# Patient Record
Sex: MALE | Race: WHITE | NOT HISPANIC OR LATINO | Employment: OTHER | URBAN - METROPOLITAN AREA
[De-identification: names, ages, dates, MRNs, and addresses within clinical notes are randomized per-mention and may not be internally consistent; named-entity substitution may affect disease eponyms.]

---

## 2017-01-17 RX ORDER — TRAMADOL HYDROCHLORIDE AND ACETAMINOPHEN 37.5; 325 MG/1; MG/1
TABLET, FILM COATED ORAL
Qty: 90 TABLET | Refills: 0 | Status: SHIPPED | OUTPATIENT
Start: 2017-01-17 | End: 2017-05-11 | Stop reason: SDUPTHER

## 2017-05-11 NOTE — TELEPHONE ENCOUNTER
----- Message from Dasia Adkins sent at 5/11/2017  2:06 PM CDT -----  Tramadol APAP 37.5-325 mg    Needs refill  [facility]  Reach pt at [phone]

## 2017-05-12 RX ORDER — TRAMADOL HYDROCHLORIDE AND ACETAMINOPHEN 37.5; 325 MG/1; MG/1
1 TABLET, FILM COATED ORAL EVERY 6 HOURS PRN
Qty: 90 TABLET | Refills: 0 | Status: SHIPPED | OUTPATIENT
Start: 2017-05-12 | End: 2017-07-11

## 2017-06-26 PROBLEM — R42 DIZZINESS: Status: ACTIVE | Noted: 2017-06-26

## 2017-06-27 PROBLEM — I95.1 ORTHOSTATIC HYPOTENSION: Status: ACTIVE | Noted: 2017-06-26

## 2017-06-27 PROBLEM — I65.01 STENOSIS OF RIGHT VERTEBRAL ARTERY: Status: ACTIVE | Noted: 2017-06-27

## 2017-07-11 ENCOUNTER — OFFICE VISIT (OUTPATIENT)
Dept: FAMILY MEDICINE | Facility: CLINIC | Age: 82
End: 2017-07-11
Payer: MEDICARE

## 2017-07-11 VITALS
WEIGHT: 151.25 LBS | HEIGHT: 71 IN | DIASTOLIC BLOOD PRESSURE: 70 MMHG | OXYGEN SATURATION: 94 % | BODY MASS INDEX: 21.18 KG/M2 | HEART RATE: 68 BPM | SYSTOLIC BLOOD PRESSURE: 110 MMHG

## 2017-07-11 DIAGNOSIS — I10 ESSENTIAL HYPERTENSION: ICD-10-CM

## 2017-07-11 DIAGNOSIS — E03.9 ACQUIRED HYPOTHYROIDISM: ICD-10-CM

## 2017-07-11 DIAGNOSIS — I95.1 ORTHOSTATIC HYPOTENSION: Primary | ICD-10-CM

## 2017-07-11 DIAGNOSIS — I70.0 ATHEROSCLEROSIS OF AORTA: ICD-10-CM

## 2017-07-11 DIAGNOSIS — I65.01 STENOSIS OF RIGHT VERTEBRAL ARTERY: ICD-10-CM

## 2017-07-11 PROCEDURE — 1159F MED LIST DOCD IN RCRD: CPT | Mod: S$GLB,,,

## 2017-07-11 PROCEDURE — 99499 UNLISTED E&M SERVICE: CPT | Mod: S$GLB,,,

## 2017-07-11 PROCEDURE — 99214 OFFICE O/P EST MOD 30 MIN: CPT | Mod: S$GLB,,,

## 2017-07-11 PROCEDURE — 1126F AMNT PAIN NOTED NONE PRSNT: CPT | Mod: S$GLB,,,

## 2017-07-11 PROCEDURE — 99999 PR PBB SHADOW E&M-EST. PATIENT-LVL III: CPT | Mod: PBBFAC,,,

## 2017-07-11 RX ORDER — TRAMADOL HYDROCHLORIDE AND ACETAMINOPHEN 37.5; 325 MG/1; MG/1
1 TABLET, FILM COATED ORAL EVERY 6 HOURS PRN
Qty: 90 TABLET | Refills: 0 | Status: SHIPPED | OUTPATIENT
Start: 2017-07-11 | End: 2017-08-19 | Stop reason: SDUPTHER

## 2017-07-11 RX ORDER — MELOXICAM 7.5 MG/1
7.5 TABLET ORAL CONTINUOUS PRN
COMMUNITY
End: 2017-07-11

## 2017-07-11 NOTE — PROGRESS NOTES
Subjective:       Patient ID: Edwar Jeffries is a 89 y.o. male.    Chief Complaint: Dizziness    HPI The patient presents following a recent hospitalization for dizziness and orthostatic hypotension. He was evaluated at [facility] and found to have evidence of dehydration and incidental finding of 50 % carotid stenosis of right vertebral artery.     Review of Systems   Constitutional: Negative.    Respiratory: Negative.  Negative for shortness of breath.    Cardiovascular: Negative for chest pain.   Psychiatric/Behavioral: Negative.    All other systems reviewed and are negative.      Objective:      Vitals:    07/11/17 1036   BP: 110/70   Pulse: 68     Physical Exam   Constitutional: He is oriented to person, place, and time. He appears well-developed and well-nourished. He is cooperative. No distress.   HENT:   Head: Normocephalic and atraumatic.   Right Ear: Hearing, tympanic membrane, external ear and ear canal normal.   Left Ear: Hearing, external ear and ear canal normal.   Nose: Nose normal.   Mouth/Throat: Oropharynx is clear and moist.   Eyes: Conjunctivae are normal. Pupils are equal, round, and reactive to light.   Neck: Normal range of motion. Neck supple. No thyromegaly present.   Cardiovascular: Normal rate, regular rhythm, normal heart sounds and intact distal pulses.    Pulmonary/Chest: Effort normal and breath sounds normal. No respiratory distress.   Musculoskeletal: Normal range of motion. He exhibits no edema or tenderness.   Lymphadenopathy:     He has no cervical adenopathy.   Neurological: He is alert and oriented to person, place, and time. He has normal strength. Coordination and gait normal.   Skin: Skin is warm, dry and intact. No cyanosis. Nails show no clubbing.   Psychiatric: He has a normal mood and affect. His speech is normal and behavior is normal. Judgment and thought content normal. Cognition and memory are normal.   Vitals reviewed.      Assessment:        1. Orthostatic hypotension    2. Atherosclerosis of aorta    3. Stenosis of right vertebral artery    4. Acquired hypothyroidism    5. Essential hypertension        Plan:       Orthostatic hypotension    Atherosclerosis of aorta    Stenosis of right vertebral artery    Acquired hypothyroidism    Essential hypertension    Other orders  -     Discontinue: zoster vaccine live, PF, (ZOSTAVAX, PF,) 19,400 unit/0.65 mL injection; Inject 19,400 Units into the skin once.  Dispense: 1 vial; Refill: 0  -     zoster vaccine live, PF, (ZOSTAVAX, PF,) 19,400 unit/0.65 mL injection; Inject 19,400 Units into the skin once.  Dispense: 1 vial; Refill: 0  -     tramadol-acetaminophen 37.5-325 mg (ULTRACET) 37.5-325 mg Tab; Take 1 tablet by mouth every 6 (six) hours as needed.  Dispense: 90 tablet; Refill: 0      Return in about 6 months (around 1/11/2018).

## 2017-07-19 ENCOUNTER — TELEPHONE (OUTPATIENT)
Dept: FAMILY MEDICINE | Facility: CLINIC | Age: 82
End: 2017-07-19

## 2017-07-19 NOTE — TELEPHONE ENCOUNTER
Pts daughter is calling to report that they are taking pts BP in the morning and it is still low. 110/54, 90/52, and today 117/70 (did not take BP medication yesterday) please advise on what they should do.

## 2017-08-24 RX ORDER — TRAMADOL HYDROCHLORIDE AND ACETAMINOPHEN 37.5; 325 MG/1; MG/1
TABLET, FILM COATED ORAL
Qty: 90 TABLET | Refills: 0 | Status: SHIPPED | OUTPATIENT
Start: 2017-08-24 | End: 2017-11-30 | Stop reason: SDUPTHER

## 2017-09-07 ENCOUNTER — OFFICE VISIT (OUTPATIENT)
Dept: FAMILY MEDICINE | Facility: CLINIC | Age: 82
End: 2017-09-07
Payer: MEDICARE

## 2017-09-07 ENCOUNTER — TELEPHONE (OUTPATIENT)
Dept: FAMILY MEDICINE | Facility: CLINIC | Age: 82
End: 2017-09-07

## 2017-09-07 VITALS
WEIGHT: 155.19 LBS | SYSTOLIC BLOOD PRESSURE: 120 MMHG | HEIGHT: 71 IN | BODY MASS INDEX: 21.73 KG/M2 | DIASTOLIC BLOOD PRESSURE: 76 MMHG | HEART RATE: 75 BPM | OXYGEN SATURATION: 95 %

## 2017-09-07 DIAGNOSIS — I10 ESSENTIAL HYPERTENSION: ICD-10-CM

## 2017-09-07 DIAGNOSIS — M47.812 SPONDYLOSIS OF CERVICAL REGION WITHOUT MYELOPATHY OR RADICULOPATHY: Primary | ICD-10-CM

## 2017-09-07 PROCEDURE — 99999 PR PBB SHADOW E&M-EST. PATIENT-LVL III: CPT | Mod: PBBFAC,,,

## 2017-09-07 PROCEDURE — 1159F MED LIST DOCD IN RCRD: CPT | Mod: S$GLB,,,

## 2017-09-07 PROCEDURE — 3008F BODY MASS INDEX DOCD: CPT | Mod: S$GLB,,,

## 2017-09-07 PROCEDURE — 99499 UNLISTED E&M SERVICE: CPT | Mod: S$GLB,,,

## 2017-09-07 PROCEDURE — 1125F AMNT PAIN NOTED PAIN PRSNT: CPT | Mod: S$GLB,,,

## 2017-09-07 PROCEDURE — 99213 OFFICE O/P EST LOW 20 MIN: CPT | Mod: 25,S$GLB,,

## 2017-09-07 PROCEDURE — 96372 THER/PROPH/DIAG INJ SC/IM: CPT | Mod: S$GLB,,,

## 2017-09-07 RX ORDER — TRIAMCINOLONE ACETONIDE 40 MG/ML
40 INJECTION, SUSPENSION INTRA-ARTICULAR; INTRAMUSCULAR
Status: COMPLETED | OUTPATIENT
Start: 2017-09-07 | End: 2017-09-07

## 2017-09-07 RX ADMIN — TRIAMCINOLONE ACETONIDE 40 MG: 40 INJECTION, SUSPENSION INTRA-ARTICULAR; INTRAMUSCULAR at 10:09

## 2017-09-07 NOTE — PROGRESS NOTES
Subjective:       Patient ID: Edwar Jeffries is a 89 y.o. male.    Chief Complaint: Neck Pain    HPI The patient presents with complaints of pain in his neck. He was diagnosed with spinal stenosis by Dr. Jada Bojorquez before she retired. Gradually worsening and limiting his ability to go dancing, bowl. It is starting to effect his sleep. He is wearing a neck brace almost constantly. He previously went to PT which he was adverse to and it didn't help. He is not certain when his last X-Ray was taken.      His blood pressure has been monitored and have normal.     Review of Systems   Constitutional: Negative.    Respiratory: Negative.  Negative for shortness of breath.    Cardiovascular: Negative for chest pain.   Musculoskeletal: Positive for neck pain and neck stiffness.   Psychiatric/Behavioral: Positive for sleep disturbance. Negative for agitation, dysphoric mood and suicidal ideas. The patient is not nervous/anxious.    All other systems reviewed and are negative.      Objective:      Vitals:    09/07/17 0913   BP: 120/76   Pulse: 75     Physical Exam   Constitutional: He is oriented to person, place, and time. He appears well-developed and well-nourished. He is cooperative. No distress.   HENT:   Head: Normocephalic and atraumatic.   Right Ear: Hearing, tympanic membrane, external ear and ear canal normal.   Left Ear: Hearing, external ear and ear canal normal.   Nose: Nose normal.   Mouth/Throat: Oropharynx is clear and moist.   Eyes: Conjunctivae are normal. Pupils are equal, round, and reactive to light.   Neck: Normal range of motion. Neck supple. No thyromegaly present.   Cardiovascular: Normal rate, regular rhythm, normal heart sounds and intact distal pulses.    Pulmonary/Chest: Effort normal and breath sounds normal. No respiratory distress.   Musculoskeletal: He exhibits tenderness. He exhibits no edema.   Wearing soft cervical collar   Kyphosis    Lymphadenopathy:     He has no cervical adenopathy.    Neurological: He is alert and oriented to person, place, and time. He has normal strength. Coordination and gait normal.   Skin: Skin is warm, dry and intact. No cyanosis. Nails show no clubbing.   Psychiatric: He has a normal mood and affect. His speech is normal and behavior is normal. Judgment and thought content normal. Cognition and memory are normal.   Vitals reviewed.      Assessment:       1. Spondylosis of cervical region without myelopathy or radiculopathy    2. Essential hypertension        Plan:       Spondylosis of cervical region without myelopathy or radiculopathy  -     triamcinolone acetonide injection 40 mg; Inject 1 mL (40 mg total) into the muscle one time.  -     Ambulatory referral to Pain Clinic  -     X-Ray Cervical Spine Complete 5 view; Future; Expected date: 09/07/2017    Essential hypertension      Return if symptoms worsen or fail to improve.

## 2017-09-07 NOTE — TELEPHONE ENCOUNTER
Spoke to the patient daughter and notified her of his xray results ,patient daughter verbalized understood

## 2017-09-13 ENCOUNTER — DOCUMENTATION ONLY (OUTPATIENT)
Dept: PAIN MEDICINE | Facility: CLINIC | Age: 82
End: 2017-09-13

## 2017-09-13 ENCOUNTER — OFFICE VISIT (OUTPATIENT)
Dept: PAIN MEDICINE | Facility: CLINIC | Age: 82
End: 2017-09-13
Payer: MEDICARE

## 2017-09-13 VITALS
DIASTOLIC BLOOD PRESSURE: 76 MMHG | SYSTOLIC BLOOD PRESSURE: 159 MMHG | HEIGHT: 71 IN | RESPIRATION RATE: 18 BRPM | WEIGHT: 150 LBS | BODY MASS INDEX: 21 KG/M2 | HEART RATE: 68 BPM

## 2017-09-13 DIAGNOSIS — M79.18 MYOFASCIAL PAIN: ICD-10-CM

## 2017-09-13 DIAGNOSIS — M47.812 SPONDYLOSIS OF CERVICAL REGION WITHOUT MYELOPATHY OR RADICULOPATHY: Primary | ICD-10-CM

## 2017-09-13 DIAGNOSIS — M54.2 CERVICALGIA: ICD-10-CM

## 2017-09-13 DIAGNOSIS — M47.816 LUMBAR SPONDYLOSIS: ICD-10-CM

## 2017-09-13 PROCEDURE — 3008F BODY MASS INDEX DOCD: CPT | Mod: S$GLB,,, | Performed by: ANESTHESIOLOGY

## 2017-09-13 PROCEDURE — 99204 OFFICE O/P NEW MOD 45 MIN: CPT | Mod: 25,S$GLB,, | Performed by: ANESTHESIOLOGY

## 2017-09-13 PROCEDURE — 1159F MED LIST DOCD IN RCRD: CPT | Mod: S$GLB,,, | Performed by: ANESTHESIOLOGY

## 2017-09-13 PROCEDURE — 1126F AMNT PAIN NOTED NONE PRSNT: CPT | Mod: S$GLB,,, | Performed by: ANESTHESIOLOGY

## 2017-09-13 PROCEDURE — 20552 NJX 1/MLT TRIGGER POINT 1/2: CPT | Mod: S$GLB,,, | Performed by: ANESTHESIOLOGY

## 2017-09-13 RX ORDER — BUPIVACAINE HYDROCHLORIDE 2.5 MG/ML
5 INJECTION, SOLUTION EPIDURAL; INFILTRATION; INTRACAUDAL ONCE
Status: COMPLETED | OUTPATIENT
Start: 2017-09-13 | End: 2017-09-13

## 2017-09-13 RX ORDER — TRIAMCINOLONE ACETONIDE 40 MG/ML
40 INJECTION, SUSPENSION INTRA-ARTICULAR; INTRAMUSCULAR
Status: COMPLETED | OUTPATIENT
Start: 2017-09-13 | End: 2017-09-13

## 2017-09-13 RX ADMIN — TRIAMCINOLONE ACETONIDE 40 MG: 40 INJECTION, SUSPENSION INTRA-ARTICULAR; INTRAMUSCULAR at 03:09

## 2017-09-13 RX ADMIN — BUPIVACAINE HYDROCHLORIDE 12.5 MG: 2.5 INJECTION, SOLUTION EPIDURAL; INFILTRATION; INTRACAUDAL at 03:09

## 2017-09-13 NOTE — PROGRESS NOTES
Chronic Pain - New Consult    Referring Physician: Tian De Santiago Jr.*      Chief Complaint   Patient presents with    Neck Pain    Back Pain        SUBJECTIVE:    Edwar Jeffries is a 89 y/o male with hx of cervical spinal stenosis who presents to the clinic for the evaluation of neck pain. The neck pain started 20 years ago and symptoms have been worsening over the past few months. No recent trauma or inciting event. The pain is located in the bilateral cervical paraspinal area and is non-radiating.  The pain is described as sharp and spasm-like and is rated as 3/10. The pain is rated with a score of  3/10 on the BEST day and a score of 8/10 on the WORST day.  Symptoms interfere with daily activity and sleep. The pain is exacerbated by standing for a long time, head turning, and cold weather. He participates in [employer] room dancing weekly and has noticed the pain is starting to limit his ability to participate. The pain is mitigated by neck brace, Ultracet, heat and IM steroid injection.     He also complains of chronic, axial low back pain. The neck pain is more significant than the back pain. He does not experience pain in his back when wearing his back brace.     Patient denies night fever/night sweats, urinary incontinence, bowel incontinence, significant weight loss, significant motor weakness and loss of sensations.    Physical Therapy/Home Exercise: no      Pain Disability Index Review:  Last 3 PDI Scores 9/13/2017   Pain Disability Index (PDI) 29       Pain Medications:    - Ultracet TID     report:  Reviewed      Imaging:     Cervical X-ray (9/7/2017):    Alignment: There is grade 1 anterolisthesis at C3-C4, C4-C5, C5-C6, C7-T1, progressed from prior study.  Vertebrae: Vertebral body heights are maintained.  No suspicious lytic or blastic lesions.  Discs and facets: Probable fusion noted across the C6-C7 disc space.  There is moderate to severe disc height loss throughout the cervical spine.   There is bilateral facet arthropathy with bilateral multilevel neuroforaminal narrowing.  Soft tissues: Prevertebral soft tissues are maintained.    Lumbar X-ray (6/9/2016):    7 mm grade 1 anterolisthesis of L4 on L5 and 3 mm grade 1 anterolisthesis of L5 on S1 which appear to be degenerative in etiology.  Dextroconvexity of the lumbar spine.  Prominent degenerative changes throughout the lumbar spine.    Past Medical History:   Diagnosis Date    Emphysema of lung     Hyperlipidemia     Hypertension     Thyroid disease      Past Surgical History:   Procedure Laterality Date    GASTRECTOMY      HERNIA REPAIR Left     Inguinal    right shoulder Right      Social History     Social History    Marital status:      Spouse name: N/A    Number of children: N/A    Years of education: N/A     Occupational History    Not on file.     Social History Main Topics    Smoking status: Former Smoker     Types: Pipe    Smokeless tobacco: Never Used    Alcohol use No    Drug use: No    Sexual activity: Not Currently     Other Topics Concern    Not on file     Social History Narrative    Lives in [place] with his daughter. Was in the [employer] in [employer]. Retired from [employer].      History reviewed. No pertinent family history.    Review of patient's allergies indicates:  No Known Allergies    Current Outpatient Prescriptions   Medication Sig    ADVAIR DISKUS 250-50 mcg/dose diskus inhaler Inhale 1 puff into the lungs 2 (two) times daily.    aspirin 81 MG Chew Take 1 tablet (81 mg total) by mouth once daily.    levothyroxine (SYNTHROID) 75 MCG tablet Take 1 tablet (75 mcg total) by mouth before breakfast.    tramadol-acetaminophen 37.5-325 mg (ULTRACET) 37.5-325 mg Tab TAKE ONE TABLET EVERY 6 HOURS AS NEEDED    trazodone (DESYREL) 50 MG tablet Take 1 tablet (50 mg total) by mouth nightly as needed.    lovastatin (MEVACOR) 20 MG tablet Take 1 tablet (20 mg total) by mouth every evening.     Current  "Facility-Administered Medications   Medication    bupivacaine (PF) 0.25% (2.5 mg/ml) injection 12.5 mg    triamcinolone acetonide injection 40 mg       REVIEW OF SYSTEMS:  GENERAL: No weight loss, malaise or fevers.  HEENT: Negative for frequent or significant headaches.  NECK: + neck pain  RESPIRATORY: Negative for wheezing or shortness of breath.  CARDIOVASCULAR: Negative for chest pain or palpitations.  GI: No blood in stools or black stools or change in bowel habits.  : Negative for kidney stones, urinary tract infections, or incontinence.  MUSCULOSKELETAL: See HPI  SKIN: Negative for rash or itching.  PSYCH: + sleep disturbance  HEMATOLOGY/LYMPHOLOGY: Negative for prolonged bleeding, bruising easily or swollen nodes.  NEURO:  No history of syncope, seizures or tremors.    OBJECTIVE:    BP (!) 159/76 (BP Location: Left arm, Patient Position: Sitting, BP Method: Large (Automatic))   Pulse 68   Resp 18   Ht 5' 11" (1.803 m)   Wt 68 kg (150 lb)   BMI 20.92 kg/m²     PHYSICAL EXAMINATION:  GENERAL: Well appearing, in no acute distress.  PSYCH:  Mood and affect is appropriate.  Awake, alert, and oriented x 3.  SKIN: Skin color, texture, turgor normal, no rashes or lesions  HEENT: Normocephalic, atraumatic.  EOM intact.  CV: Radial pulses are 2+.  RESP:  Respirations are unlabored.  GI: Abdomen soft and non-tender.  MSK:  No atrophy or tone abnormalities are noted.      Neck: Wearing neck brace. Pain to palpation over the cervical paraspinous muscles (R>L). Spurling negative. Pain with right lateral rotation.  No obvious deformity or signs of trauma.  ROM is limited on right lateral rotation.    Back: Wearing back brace. Straight leg raising in the sitting and supine positions is negative for radicular pain. No pain to palpation over the lumbar spine and paraspinous muscles.  Positive for pain with facet loading and back extension/rotation.     Buttocks:  No pain to palpation over the PSIS.     Extremities:  " Peripheral joint ROM is full and pain free without obvious instability or laxity in all four extremities. No edema or skin discolorations noted.     Gait:  Gait is normal.    NEUR:  Bilateral upper and lower extremity coordination and muscle stretch reflexes are physiologic and symmetric. Strength testing is 5/5 throughout all muscle groups in the upper and lower extremities. No loss of sensation is noted.     ASSESSMENT: 89 y.o. male with chronic neck pain, consistent with      1. Spondylosis of cervical region without myelopathy or radiculopathy    2. Cervicalgia    3. Myofascial pain    4. Lumbar spondylosis          PLAN:     - I have stressed the importance of physical activity and a home exercise plan to help with pain and improve health.  - Order MRI of the Cervical Spine.  - Rx compounded pain cream to apply to painful areas.  - Cervical TPI in clinic today.  - In the future, I will consider cervical MBB.  - RTC in 2 weeks.    The above plan and management options were discussed at length with patient. Patient is in agreement with the above and verbalized understanding. It will be communicated with the referring physician via electronic record, fax, or mail.    Lorne Meade III  09/13/2017       INFORMED CONSENT: The procedure, risks, benefits and options were discussed with patient. There are no contraindications to the procedure. The patient expressed understanding and agreed to proceed. The personnel performing the procedure was discussed. I verify that I personally obtained consent prior to the start of the procedure and the signed consent can be found on the patient's chart.      PROCEDURE: BILATERAL CERVICAL PARASPINAL TRIGGER POINT INJECTION  The patient was placed in a seated position. The site of pain and procedure were confirmed with the patient prior to starting the procedure. The patient's trigger points were identified and marked. The skin was prepped with alcohol three times.  A  25-gauge 1.5 inch  needle was advanced through the skin and subcutaneous tissues.  Aspiration for blood, air and CSF was negative.  A total of 5 ml of Bupivacaine 0.25% and 40 mg Kenalog was injected throughout all trigger points.  No complications were evident. No specimens collected.    Lorne Meade III  09/13/2017

## 2017-09-13 NOTE — LETTER
September 13, 2017      Tian De Santiago Jr., MD  [facility] [id number]           [place] [facility]  [address] [place] [id number]  Phone: [phone]  Fax: [phone]          Patient: Edwar Jeffries   MR Number: [id number]   YOB: 1925   Date of Visit: 9/13/2017       Dear Dr. Tian De Santiago Jr.:    Thank you for referring Edwar Jeffries to me for evaluation. Attached you will find relevant portions of my assessment and plan of care.    If you have questions, please do not hesitate to call me. I look forward to following Edwar Jeffries along with you.    Sincerely,    Lorne Meade III, MD    Enclosure  CC:  No Recipients    If you would like to receive this communication electronically, please contact [email or web address] or [phone] to request more information on [facility] [employer] access.    For providers and/or their staff who would like to refer a patient to [facility], please contact us through our one-stop-shop provider referral line, [facility] [place], at [phone].    If you feel you have received this communication in error or would no longer like to receive these types of communications, please e-mail [email or web address]

## 2017-09-18 RX ORDER — LOVASTATIN 20 MG/1
20 TABLET ORAL NIGHTLY
Qty: 90 TABLET | Refills: 3 | Status: SHIPPED | OUTPATIENT
Start: 2017-09-18 | End: 2018-01-24 | Stop reason: SDUPTHER

## 2017-10-04 ENCOUNTER — OFFICE VISIT (OUTPATIENT)
Dept: PAIN MEDICINE | Facility: CLINIC | Age: 82
End: 2017-10-04
Payer: MEDICARE

## 2017-10-04 VITALS
HEART RATE: 74 BPM | WEIGHT: 149 LBS | RESPIRATION RATE: 18 BRPM | DIASTOLIC BLOOD PRESSURE: 75 MMHG | SYSTOLIC BLOOD PRESSURE: 144 MMHG | BODY MASS INDEX: 20.86 KG/M2 | HEIGHT: 71 IN

## 2017-10-04 DIAGNOSIS — M50.30 DDD (DEGENERATIVE DISC DISEASE), CERVICAL: ICD-10-CM

## 2017-10-04 DIAGNOSIS — M54.2 CERVICALGIA: ICD-10-CM

## 2017-10-04 DIAGNOSIS — M47.816 LUMBAR SPONDYLOSIS: ICD-10-CM

## 2017-10-04 DIAGNOSIS — G89.29 CHRONIC BILATERAL LOW BACK PAIN WITHOUT SCIATICA: ICD-10-CM

## 2017-10-04 DIAGNOSIS — M47.812 SPONDYLOSIS OF CERVICAL REGION WITHOUT MYELOPATHY OR RADICULOPATHY: Primary | ICD-10-CM

## 2017-10-04 DIAGNOSIS — M54.50 CHRONIC BILATERAL LOW BACK PAIN WITHOUT SCIATICA: ICD-10-CM

## 2017-10-04 PROCEDURE — 99213 OFFICE O/P EST LOW 20 MIN: CPT | Mod: S$GLB,,, | Performed by: ANESTHESIOLOGY

## 2017-10-04 RX ORDER — DOXEPIN HYDROCHLORIDE 50 MG/G
CREAM TOPICAL
COMMUNITY
Start: 2017-09-14

## 2017-10-04 NOTE — PROGRESS NOTES
Chronic Pain - Established    INTERVAL HISTORY (10/4/2017):    Edwar Jeffries presents today for a follow-up appointment for neck pain. Since the last visit, the neck pain has been improving. Current pain intensity is 6/10. He reports 35% pain relief after the trigger point injections performed at last visit which continues. He reports his low back pain has been bothering him more than the neck pain. The low back pain is constant. He describes it as a dull, aching pain and rates it 8/10. The pain is made better with wearing his back brace. He states the pain is tolerable. He participates in ballroom dancing once /week and does not experience the pain when dancing. He is here today to review recent cervical MRI results.     SUBJECTIVE:    Edwar Jeffries is a 89 y/o male with hx of cervical spinal stenosis who presents to the clinic for the evaluation of neck pain. The neck pain started 20 years ago and symptoms have been worsening over the past few months. No recent trauma or inciting event. The pain is located in the bilateral cervical paraspinal area and is non-radiating.  The pain is described as sharp and spasm-like and is rated as 3/10. The pain is rated with a score of  3/10 on the BEST day and a score of 8/10 on the WORST day.  Symptoms interfere with daily activity and sleep. The pain is exacerbated by standing for a long time, head turning, and cold weather. He participates in ball room dancing weekly and has noticed the pain is starting to limit his ability to participate. The pain is mitigated by neck brace, Ultracet, heat and IM steroid injection.     He also complains of chronic, axial low back pain. The neck pain is more significant than the back pain. He does not experience pain in his back when wearing his back brace.     Patient denies night fever/night sweats, urinary incontinence, bowel incontinence, significant weight loss, significant motor weakness and loss of sensations.    Physical  Therapy/Home Exercise: no      Pain Disability Index Review:  Last 3 PDI Scores 10/4/2017 9/13/2017   Pain Disability Index (PDI) 14 29       Pain Medications:    - Ultracet TID     report:  Reviewed      Imaging:     Cervical MRI (9/20/2017):    Findings: The vertebral body heights are satisfactorily preserved. There is loss of disc space height with degenerative endplate change and disc desiccation throughout the cervical spine. The spinal cord has a normal appearance. There is no intradural abnormality. There is no evidence of an acute marrow replacement process such as tumor or infection. There is no fracture. The visualized base of the brain and base of the skull are unremarkable. The visualized surrounding soft tissues and vascular structures are unremarkable.    C2-3: Facet hypertrophy with uncovertebral spur and a posterior disc osteophyte complex which creates moderate to severe right-sided neural foraminal stenosis and severe central canal stenosis.    C3-4: Facet hypertrophy with uncovertebral spur and a posterior disc osteophyte complex which creates moderate to severe bilateral neural foraminal stenosis and moderate to severe central canal stenosis.    C4-5: Facet hypertrophy with uncovertebral spur and a posterior disc osteophyte complex which creates moderate right-sided and severe left-sided neural foraminal stenosis along with severe central canal stenosis.    C5-6: Facet hypertrophy with uncovertebral spur which creates severe left-sided and mild right-sided neural foraminal stenosis.    C6-7: Facet hypertrophy with uncovertebral spur which creates severe left-sided neural foraminal stenosis.    C7-T1: Facet hypertrophy with uncovertebral spur which creates moderate left-sided neural foraminal stenosis.    Cervical X-ray (9/7/2017):    Alignment: There is grade 1 anterolisthesis at C3-C4, C4-C5, C5-C6, C7-T1, progressed from prior study.  Vertebrae: Vertebral body heights are maintained.  No  suspicious lytic or blastic lesions.  Discs and facets: Probable fusion noted across the C6-C7 disc space.  There is moderate to severe disc height loss throughout the cervical spine.  There is bilateral facet arthropathy with bilateral multilevel neuroforaminal narrowing.  Soft tissues: Prevertebral soft tissues are maintained.    Lumbar X-ray (6/9/2016):    7 mm grade 1 anterolisthesis of L4 on L5 and 3 mm grade 1 anterolisthesis of L5 on S1 which appear to be degenerative in etiology.  Dextroconvexity of the lumbar spine.  Prominent degenerative changes throughout the lumbar spine.    Past Medical History:   Diagnosis Date    Emphysema of lung     Hyperlipidemia     Hypertension     Thyroid disease      Past Surgical History:   Procedure Laterality Date    GASTRECTOMY      HERNIA REPAIR Left     Inguinal    right shoulder Right      Social History     Social History    Marital status:      Spouse name: N/A    Number of children: N/A    Years of education: N/A     Occupational History    Not on file.     Social History Main Topics    Smoking status: Former Smoker     Types: Pipe    Smokeless tobacco: Never Used    Alcohol use No    Drug use: No    Sexual activity: Not Currently     Other Topics Concern    Not on file     Social History Narrative    Lives in [place] with his daughter. Was in the [employer] in [employer]. Retired from [employer].      No family history on file.    Review of patient's allergies indicates:  No Known Allergies    Current Outpatient Prescriptions   Medication Sig    ADVAIR DISKUS 250-50 mcg/dose diskus inhaler Inhale 1 puff into the lungs 2 (two) times daily.    aspirin 81 MG Chew Take 1 tablet (81 mg total) by mouth once daily.    doxepin (ZONALON) 5 % cream     levothyroxine (SYNTHROID) 75 MCG tablet Take 1 tablet (75 mcg total) by mouth before breakfast.    lovastatin (MEVACOR) 20 MG tablet Take 1 tablet (20 mg total) by mouth every evening.    tramadol-acetaminophen  "37.5-325 mg (ULTRACET) 37.5-325 mg Tab TAKE ONE TABLET EVERY 6 HOURS AS NEEDED    trazodone (DESYREL) 50 MG tablet Take 1 tablet (50 mg total) by mouth nightly as needed.     No current facility-administered medications for this visit.        REVIEW OF SYSTEMS:  GENERAL: No weight loss, malaise or fevers.  HEENT: Negative for frequent or significant headaches.  NECK: + neck pain  RESPIRATORY: Negative for wheezing or shortness of breath.  CARDIOVASCULAR: Negative for chest pain or palpitations.  GI: No blood in stools or black stools or change in bowel habits.  : Negative for kidney stones, urinary tract infections, or incontinence.  MUSCULOSKELETAL: See HPI  SKIN: Negative for rash or itching.  PSYCH: + sleep disturbance  HEMATOLOGY/LYMPHOLOGY: Negative for prolonged bleeding, bruising easily or swollen nodes.  NEURO:  No history of syncope, seizures or tremors.    OBJECTIVE:    BP (!) 144/75 (BP Location: Left arm, Patient Position: Sitting, BP Method: Medium (Automatic))   Pulse 74   Resp 18   Ht 5' 11" (1.803 m)   Wt 67.6 kg (149 lb)   BMI 20.78 kg/m²     PHYSICAL EXAMINATION:  GENERAL: Well appearing, in no acute distress.  PSYCH:  Mood and affect is appropriate.  Awake, alert, and oriented x 3.  SKIN: Skin color, texture, turgor normal, no rashes or lesions  HEENT: Normocephalic, atraumatic.  EOM intact.  CV: Radial pulses are 2+.  RESP:  Respirations are unlabored.  GI: Abdomen soft and non-tender.  MSK:  No atrophy or tone abnormalities are noted.      Neck: Pain to palpation over the cervical paraspinous muscles. No obvious deformity or signs of trauma.      Back: Wearing back brace. Pain to palpation over the lumbar paraspinous muscles and facets.  Positive for pain with facet loading and back extension/rotation.     Buttocks:  No pain to palpation over the PSIS.     Extremities:  Peripheral joint ROM is full and pain free without obvious instability or laxity in all four extremities. No edema or " skin discolorations noted.     Gait:  Gait is normal.    NEUR:  Strength testing is 5/5 throughout all muscle groups in the upper and lower extremities. No loss of sensation is noted.     ASSESSMENT: 89 y.o. male with chronic neck and low back pain, consistent with facet arthropathy. Discussed options for low back pain including lumbar medial branch blocks.  The patient reports his pain is tolerable at this time and prefers to continue with conservative care.    1. Spondylosis of cervical region without myelopathy or radiculopathy    2. DDD (degenerative disc disease), cervical    3. Cervicalgia    4. Lumbar spondylosis    5. Chronic bilateral low back pain without sciatica          PLAN:     - I have stressed the importance of physical activity and a home exercise plan to help with pain and improve health.  - In the future, I will consider lumbar MBB.  - RTC as needed.    The above plan and management options were discussed at length with patient. Patient is in agreement with the above and verbalized understanding. It will be communicated with the referring physician via electronic record, fax, or mail.    Lorne Meade III  10/04/2017

## 2017-11-13 RX ORDER — TRAZODONE HYDROCHLORIDE 50 MG/1
50 TABLET ORAL NIGHTLY PRN
Qty: 30 TABLET | Refills: 11 | Status: SHIPPED | OUTPATIENT
Start: 2017-11-13 | End: 2018-09-17 | Stop reason: SDUPTHER

## 2017-11-30 RX ORDER — TRAMADOL HYDROCHLORIDE AND ACETAMINOPHEN 37.5; 325 MG/1; MG/1
1 TABLET, FILM COATED ORAL EVERY 6 HOURS PRN
Qty: 90 TABLET | Refills: 0 | Status: SHIPPED | OUTPATIENT
Start: 2017-11-30 | End: 2018-01-10 | Stop reason: SDUPTHER

## 2017-11-30 NOTE — TELEPHONE ENCOUNTER
----- Message from Kavya Marie sent at 11/30/2017  3:43 PM CST -----  Contact: Miracle daughter [phone]  REFILLS:   Patient is requesting a medication refill.   RX name: Tremadol   Strength: 50 mg  Directions:   Pharmacy name: [facility]  Pharmacy phone #:

## 2017-12-28 ENCOUNTER — OFFICE VISIT (OUTPATIENT)
Dept: FAMILY MEDICINE | Facility: CLINIC | Age: 82
End: 2017-12-28
Payer: MEDICARE

## 2017-12-28 VITALS
DIASTOLIC BLOOD PRESSURE: 60 MMHG | SYSTOLIC BLOOD PRESSURE: 118 MMHG | WEIGHT: 151.88 LBS | HEIGHT: 71 IN | BODY MASS INDEX: 21.26 KG/M2 | HEART RATE: 60 BPM | RESPIRATION RATE: 18 BRPM | OXYGEN SATURATION: 96 %

## 2017-12-28 DIAGNOSIS — M25.461 EFFUSION, RIGHT KNEE: Primary | ICD-10-CM

## 2017-12-28 DIAGNOSIS — Z29.9 PREVENTIVE MEASURE: ICD-10-CM

## 2017-12-28 DIAGNOSIS — J43.8 OTHER EMPHYSEMA: ICD-10-CM

## 2017-12-28 PROCEDURE — 99999 PR PBB SHADOW E&M-EST. PATIENT-LVL III: CPT | Mod: PBBFAC,,,

## 2017-12-28 PROCEDURE — 99213 OFFICE O/P EST LOW 20 MIN: CPT | Mod: 25,S$GLB,,

## 2017-12-28 PROCEDURE — 99499 UNLISTED E&M SERVICE: CPT | Mod: S$GLB,,,

## 2017-12-28 PROCEDURE — 96372 THER/PROPH/DIAG INJ SC/IM: CPT | Mod: 59,S$GLB,,

## 2017-12-28 PROCEDURE — 20610 DRAIN/INJ JOINT/BURSA W/O US: CPT | Mod: RT,S$GLB,,

## 2017-12-28 RX ORDER — TRIAMCINOLONE ACETONIDE 40 MG/ML
40 INJECTION, SUSPENSION INTRA-ARTICULAR; INTRAMUSCULAR
Status: COMPLETED | OUTPATIENT
Start: 2017-12-28 | End: 2017-12-28

## 2017-12-28 RX ORDER — LIDOCAINE HYDROCHLORIDE 10 MG/ML
1 INJECTION, SOLUTION EPIDURAL; INFILTRATION; INTRACAUDAL; PERINEURAL
Status: COMPLETED | OUTPATIENT
Start: 2017-12-28 | End: 2017-12-28

## 2017-12-28 RX ADMIN — TRIAMCINOLONE ACETONIDE 40 MG: 40 INJECTION, SUSPENSION INTRA-ARTICULAR; INTRAMUSCULAR at 02:12

## 2017-12-28 RX ADMIN — LIDOCAINE HYDROCHLORIDE 10 MG: 10 INJECTION, SOLUTION EPIDURAL; INFILTRATION; INTRACAUDAL; PERINEURAL at 02:12

## 2017-12-28 NOTE — PROGRESS NOTES
Subjective:       Patient ID: Edwar Jeffries is a 89 y.o. male.    Chief Complaint: Pain (rt knee with swelling X days, Low Back Pain off and on)    HPI The patient presents with complaints of pain in his right knee for the past 4-5 days. No injury or change in activity. He has been using crutches, ice, compression dressings.      Review of Systems   Constitutional: Negative.    Respiratory: Negative.  Negative for shortness of breath.    Cardiovascular: Negative for chest pain.   Psychiatric/Behavioral: Negative.    All other systems reviewed and are negative.      Objective:      Vitals:    12/28/17 1403   BP: 118/60   Pulse: 60   Resp: 18     Physical Exam   Constitutional: He is oriented to person, place, and time. He appears well-developed and well-nourished. He is cooperative. No distress.   HENT:   Head: Normocephalic and atraumatic.   Right Ear: Hearing, tympanic membrane, external ear and ear canal normal.   Left Ear: Hearing, external ear and ear canal normal.   Nose: Nose normal.   Mouth/Throat: Oropharynx is clear and moist.   Eyes: Conjunctivae are normal. Pupils are equal, round, and reactive to light.   Neck: Normal range of motion. Neck supple. No thyromegaly present.   Cardiovascular: Normal rate, regular rhythm, normal heart sounds and intact distal pulses.    Pulmonary/Chest: Effort normal and breath sounds normal. No respiratory distress.   Musculoskeletal: He exhibits edema and tenderness.   Lymphadenopathy:     He has no cervical adenopathy.   Neurological: He is alert and oriented to person, place, and time. He has normal strength. Coordination and gait normal.   Skin: Skin is warm, dry and intact. No cyanosis. Nails show no clubbing.   Psychiatric: He has a normal mood and affect. His speech is normal and behavior is normal. Judgment and thought content normal. Cognition and memory are normal.   Vitals reviewed.              Assessment:       1. Effusion, right knee    2. Preventive  measure    3. Other emphysema        Plan:       Effusion, right knee  -     triamcinolone acetonide injection 40 mg; Inject 1 mL (40 mg total) into the muscle one time.  -     lidocaine (PF) 10 mg/ml (1%) injection 10 mg; 1 mL (10 mg total) by Other route one time.    Preventive measure  -     (In Office Administered) Pneumococcal Polysaccharide Vaccine (23 Valent) (SQ/IM)  -     diphth,pertus,acell,,tetanus (BOOSTRIX) 2.5-8-5 Lf-mcg-Lf/0.5mL Susp; Inject 0.5 mLs into the muscle once.  Dispense: 0.5 mL; Refill: 0  -     zoster vaccine live, PF, (ZOSTAVAX, PF,) 19,400 unit/0.65 mL injection; Inject 19,400 Units into the skin once.  Dispense: 1 vial; Refill: 0    Other emphysema      Return if symptoms worsen or fail to improve.        Procedure note     Date and time same as visit  Procedure: arthrocentesis   Indications: pain and swelling   Patient consent: Verbal  Pertinent labs: None  Anesthesia: Xylocaine 1% plain local anesthesia  Procedure note: Aspirated fluid via 3 way stopcock and injected  Complications: None   Blood loss: None  Disposition: The patient tolerated the procedure well without complications

## 2018-01-02 ENCOUNTER — TELEPHONE (OUTPATIENT)
Dept: FAMILY MEDICINE | Facility: CLINIC | Age: 83
End: 2018-01-02

## 2018-01-02 RX ORDER — FLUTICASONE PROPIONATE AND SALMETEROL 50; 250 UG/1; UG/1
1 POWDER RESPIRATORY (INHALATION) 2 TIMES DAILY
Qty: 60 EACH | Refills: 11 | Status: SHIPPED | OUTPATIENT
Start: 2018-01-02 | End: 2018-04-06 | Stop reason: SDUPTHER

## 2018-01-10 ENCOUNTER — TELEPHONE (OUTPATIENT)
Dept: FAMILY MEDICINE | Facility: CLINIC | Age: 83
End: 2018-01-10

## 2018-01-10 RX ORDER — TRAMADOL HYDROCHLORIDE AND ACETAMINOPHEN 37.5; 325 MG/1; MG/1
1 TABLET, FILM COATED ORAL EVERY 6 HOURS PRN
Qty: 90 TABLET | Refills: 0 | Status: SHIPPED | OUTPATIENT
Start: 2018-01-10 | End: 2018-03-21 | Stop reason: SDUPTHER

## 2018-01-10 NOTE — TELEPHONE ENCOUNTER
----- Message from Zeina Curtis sent at 1/10/2018 11:10 AM CST -----  Contact: Milady (daughter)/ [phone] or [phone]  Patient called in to get prescription refill.     Please call and advise.     tramadol-acetaminophen 37.5-325 mg (ULTRACET) 37.5-325 mg Tab

## 2018-01-24 RX ORDER — LOVASTATIN 20 MG/1
20 TABLET ORAL NIGHTLY
Qty: 90 TABLET | Refills: 3 | Status: SHIPPED | OUTPATIENT
Start: 2018-01-24 | End: 2018-09-17 | Stop reason: SDUPTHER

## 2018-01-24 RX ORDER — LEVOTHYROXINE SODIUM 75 UG/1
75 TABLET ORAL
Qty: 90 TABLET | Refills: 3 | Status: SHIPPED | OUTPATIENT
Start: 2018-01-24 | End: 2018-04-29 | Stop reason: HOSPADM

## 2018-02-07 ENCOUNTER — TELEPHONE (OUTPATIENT)
Dept: FAMILY MEDICINE | Facility: CLINIC | Age: 83
End: 2018-02-07

## 2018-02-07 NOTE — TELEPHONE ENCOUNTER
----- Message from Alex Marie sent at 2/7/2018 10:29 AM CST -----  Contact: Milady Gracia (daughter)/[phone] or [phone]  Patient's daughter called to state her father's equilibrium is off and he currently has an upper respiratory issues as well right now along with coughing and sneezing.  He has not had a flu shot this season, just an FYI.    Please call and advise.

## 2018-02-08 ENCOUNTER — OFFICE VISIT (OUTPATIENT)
Dept: FAMILY MEDICINE | Facility: CLINIC | Age: 83
End: 2018-02-08
Payer: MEDICARE

## 2018-02-08 VITALS
HEIGHT: 71 IN | BODY MASS INDEX: 20.86 KG/M2 | DIASTOLIC BLOOD PRESSURE: 68 MMHG | WEIGHT: 149 LBS | SYSTOLIC BLOOD PRESSURE: 96 MMHG | RESPIRATION RATE: 18 BRPM | HEART RATE: 86 BPM

## 2018-02-08 DIAGNOSIS — R42 DIZZINESS: ICD-10-CM

## 2018-02-08 DIAGNOSIS — J43.8 OTHER EMPHYSEMA: ICD-10-CM

## 2018-02-08 DIAGNOSIS — E86.0 DEHYDRATION: ICD-10-CM

## 2018-02-08 DIAGNOSIS — J06.9 UPPER RESPIRATORY TRACT INFECTION, UNSPECIFIED TYPE: Primary | ICD-10-CM

## 2018-02-08 PROCEDURE — 99499 UNLISTED E&M SERVICE: CPT | Mod: S$GLB,,, | Performed by: INTERNAL MEDICINE

## 2018-02-08 PROCEDURE — 1126F AMNT PAIN NOTED NONE PRSNT: CPT | Mod: S$GLB,,, | Performed by: INTERNAL MEDICINE

## 2018-02-08 PROCEDURE — 3008F BODY MASS INDEX DOCD: CPT | Mod: S$GLB,,, | Performed by: INTERNAL MEDICINE

## 2018-02-08 PROCEDURE — 99999 PR PBB SHADOW E&M-EST. PATIENT-LVL III: CPT | Mod: PBBFAC,,, | Performed by: INTERNAL MEDICINE

## 2018-02-08 PROCEDURE — 1159F MED LIST DOCD IN RCRD: CPT | Mod: S$GLB,,, | Performed by: INTERNAL MEDICINE

## 2018-02-08 PROCEDURE — 99213 OFFICE O/P EST LOW 20 MIN: CPT | Mod: PO | Performed by: INTERNAL MEDICINE

## 2018-02-08 PROCEDURE — 96372 THER/PROPH/DIAG INJ SC/IM: CPT | Mod: S$GLB,,, | Performed by: INTERNAL MEDICINE

## 2018-02-08 PROCEDURE — 99213 OFFICE O/P EST LOW 20 MIN: CPT | Mod: 25,S$GLB,, | Performed by: INTERNAL MEDICINE

## 2018-02-08 RX ORDER — TRIAMCINOLONE ACETONIDE 40 MG/ML
40 INJECTION, SUSPENSION INTRA-ARTICULAR; INTRAMUSCULAR
Status: COMPLETED | OUTPATIENT
Start: 2018-02-08 | End: 2018-02-08

## 2018-02-08 RX ORDER — BENZONATATE 100 MG/1
100 CAPSULE ORAL 3 TIMES DAILY PRN
Qty: 30 CAPSULE | Refills: 1 | Status: SHIPPED | OUTPATIENT
Start: 2018-02-08 | End: 2018-02-18

## 2018-02-08 RX ADMIN — TRIAMCINOLONE ACETONIDE 40 MG: 40 INJECTION, SUSPENSION INTRA-ARTICULAR; INTRAMUSCULAR at 03:02

## 2018-02-08 NOTE — PATIENT INSTRUCTIONS
We are giving you a steroid shot for your upper respiratory symptoms. You have been given a steroid shot to help manage your symptoms. Possible side effects of this medication are sleeplessness, irritability, and increased hunger.  I have also prescribed a cough suppressant. Your dizziness appears to be from dehydration. Please try to increase fluid intake.

## 2018-02-08 NOTE — PROGRESS NOTES
Subjective:       Patient ID: Edwar Jeffries is a 89 y.o. male.    Chief Complaint: sinus issues; Cough; and Gait Problem (started recently)    The patient is new to me.  I have reviewed the PMH,  and  for this patient. HE is accompanied by his daughter who provides the history because he is very hard of hearing. She reports that he has been having runny nose and cough. HE is still eating ok. HE has become more unsteady on his feet. HE is usually able to bowl and dance, but has not been able to lately. HE is not having purulent sputum or fever.       Cough   This is a new problem. The current episode started in the past 7 days. The problem has been unchanged. The problem occurs every few minutes. The cough is non-productive. Associated symptoms include rhinorrhea. Pertinent negatives include no chest pain, chills, ear pain, eye redness, fever, headaches, myalgias, nasal congestion, rash, sore throat, shortness of breath, weight loss or wheezing. Nothing aggravates the symptoms. He has tried nothing for the symptoms.   Dizziness:   Chronicity:  New  Onset:  In the past 7 days  Progression since onset:  Unchanged  Frequency:  Constantly  Severity:  Mild  Dizziness characteristics:  Off-balanceno ear pain, no fever, no headaches, no nausea, no vomiting, no visual disturbances, no palpitations, no facial weakness and no chest pain.  Aggravated by:  Getting up  Treatments tried:  Nothing    Review of Systems   Constitutional: Negative for chills, fatigue, fever and weight loss.   HENT: Positive for rhinorrhea. Negative for congestion, ear discharge, ear pain and sore throat.    Eyes: Negative for discharge, redness and itching.   Respiratory: Positive for cough. Negative for chest tightness, shortness of breath and wheezing.    Cardiovascular: Negative for chest pain, palpitations and leg swelling.   Gastrointestinal: Negative for abdominal pain, constipation, diarrhea, nausea and vomiting.   Endocrine: Negative  for cold intolerance and heat intolerance.   Genitourinary: Negative for dysuria, flank pain, frequency and hematuria.   Musculoskeletal: Negative for arthralgias, back pain, joint swelling and myalgias.   Skin: Negative for color change and rash.   Neurological: Positive for dizziness. Negative for tremors, numbness and headaches.   Psychiatric/Behavioral: Negative for dysphoric mood and sleep disturbance. The patient is not nervous/anxious.        Objective:      Physical Exam   Constitutional: He appears well-developed and well-nourished.   HENT:   Head: Normocephalic and atraumatic.   Right Ear: External ear normal. Tympanic membrane is not erythematous. No middle ear effusion.   Left Ear: External ear normal. Tympanic membrane is not erythematous.  No middle ear effusion.   Mouth/Throat: Posterior oropharyngeal erythema present. No posterior oropharyngeal edema. No tonsillar exudate.   Eyes: Conjunctivae and EOM are normal. Pupils are equal, round, and reactive to light.   Neck: No thyromegaly present.   Cardiovascular: Normal rate, regular rhythm, normal heart sounds and intact distal pulses.    No murmur heard.  Pulmonary/Chest: Effort normal and breath sounds normal. He has no wheezes.   Abdominal: He exhibits no distension. There is no tenderness.   Musculoskeletal: He exhibits no edema or tenderness.   Lymphadenopathy:     He has no cervical adenopathy.   Neurological: He displays normal reflexes. No cranial nerve deficit.   Skin: Skin is warm and dry. No rash noted.   Psychiatric: He has a normal mood and affect. His behavior is normal.       Assessment and Plan:     Problem List Items Addressed This Visit     Emphysema of lung- stable. HE takes advair.       Other Visit Diagnoses     Upper respiratory tract infection, unspecified type    -  Primary - I do not see any reason to give antibiotic at this point. We will treat with a steroid shot and a cough suppressant.     Relevant Medications     triamcinolone acetonide injection 40 mg (Completed)    benzonatate (TESSALON) 100 MG capsule    Dizziness    - probably due to dehydration. His ears looked ok.       Dehydration    - Encourage him to drink more fluids. BP is pretty low.

## 2018-02-22 ENCOUNTER — PES CALL (OUTPATIENT)
Dept: ADMINISTRATIVE | Facility: CLINIC | Age: 83
End: 2018-02-22

## 2018-03-21 ENCOUNTER — TELEPHONE (OUTPATIENT)
Dept: FAMILY MEDICINE | Facility: CLINIC | Age: 83
End: 2018-03-21

## 2018-03-21 RX ORDER — TRAMADOL HYDROCHLORIDE AND ACETAMINOPHEN 37.5; 325 MG/1; MG/1
1 TABLET, FILM COATED ORAL EVERY 6 HOURS PRN
Qty: 90 TABLET | Refills: 0 | Status: SHIPPED | OUTPATIENT
Start: 2018-03-21 | End: 2018-04-06 | Stop reason: SDUPTHER

## 2018-03-21 NOTE — TELEPHONE ENCOUNTER
----- Message from Gerri Zhong sent at 3/21/2018  1:19 PM CDT -----  Contact: kassy Fernandez [phone]  Patient requesting a refill for tramadol-acetaminophen 37.5-325 mg (ULTRACET) 37.5-325 mg Tab. Jorge Gaviria. Please advise.

## 2018-04-06 RX ORDER — FLUTICASONE PROPIONATE AND SALMETEROL 50; 250 UG/1; UG/1
1 POWDER RESPIRATORY (INHALATION) 2 TIMES DAILY
Qty: 60 EACH | Refills: 11 | Status: SHIPPED | OUTPATIENT
Start: 2018-04-06 | End: 2018-09-17 | Stop reason: SDUPTHER

## 2018-04-06 RX ORDER — TRAMADOL HYDROCHLORIDE AND ACETAMINOPHEN 37.5; 325 MG/1; MG/1
1 TABLET, FILM COATED ORAL EVERY 6 HOURS PRN
Qty: 90 TABLET | Refills: 0 | Status: SHIPPED | OUTPATIENT
Start: 2018-04-06 | End: 2018-04-25 | Stop reason: SDUPTHER

## 2018-04-06 NOTE — TELEPHONE ENCOUNTER
----- Message from Melina Izquierdo sent at 4/6/2018  9:47 AM CDT -----  Contact: Madisyn / Daughter [phone]  Patient is requesting a refill on the below. Please advise      ADVAIR DISKUS 250-50 mcg/dose diskus inhaler    tramadol-acetaminophen 37.5-325 mg (ULTRACET) 37.5-325 mg Tab      Send to : Pharmacy       [facility] [place] - [address], [place]

## 2018-04-25 ENCOUNTER — TELEPHONE (OUTPATIENT)
Dept: FAMILY MEDICINE | Facility: CLINIC | Age: 83
End: 2018-04-25

## 2018-04-25 RX ORDER — TRAMADOL HYDROCHLORIDE AND ACETAMINOPHEN 37.5; 325 MG/1; MG/1
1 TABLET, FILM COATED ORAL EVERY 6 HOURS PRN
Qty: 90 TABLET | Refills: 0 | Status: SHIPPED | OUTPATIENT
Start: 2018-04-25 | End: 2018-06-15 | Stop reason: SDUPTHER

## 2018-04-25 NOTE — TELEPHONE ENCOUNTER
Pt requesting refill of ULTRACET, last filled 3/21/18 #90, the script sent in on 4/6/18 was never picked up because the patient still had some. He uses this for his arthritis and has scheduled an appt to est. Care with you on 5/7/18.

## 2018-04-25 NOTE — TELEPHONE ENCOUNTER
----- Message from Ba Hinojosa sent at 4/25/2018  2:54 PM CDT -----  Contact: daughter/Madisyn  [phone]  Pt daughter is requesting to speak with you concerning refilling the patient prescriptions   Please call and advise

## 2018-04-26 NOTE — PROGRESS NOTES
The patient last picked up his ultracet on 3/21/18. Since DR De Santiago has pasdsed away, I have refilled medication once so that he can establish with another provider.

## 2018-04-27 ENCOUNTER — OFFICE VISIT (OUTPATIENT)
Dept: FAMILY MEDICINE | Facility: CLINIC | Age: 83
End: 2018-04-27
Payer: MEDICARE

## 2018-04-27 VITALS
BODY MASS INDEX: 20.64 KG/M2 | SYSTOLIC BLOOD PRESSURE: 128 MMHG | TEMPERATURE: 98 F | RESPIRATION RATE: 16 BRPM | DIASTOLIC BLOOD PRESSURE: 76 MMHG | OXYGEN SATURATION: 98 % | HEART RATE: 70 BPM | WEIGHT: 148 LBS

## 2018-04-27 DIAGNOSIS — R04.0 EPISTAXIS: Primary | ICD-10-CM

## 2018-04-27 DIAGNOSIS — H91.93 BILATERAL HEARING LOSS, UNSPECIFIED HEARING LOSS TYPE: ICD-10-CM

## 2018-04-27 DIAGNOSIS — M47.812 SPONDYLOSIS OF CERVICAL REGION WITHOUT MYELOPATHY OR RADICULOPATHY: ICD-10-CM

## 2018-04-27 DIAGNOSIS — M48.061 SPINAL STENOSIS OF LUMBAR REGION, UNSPECIFIED WHETHER NEUROGENIC CLAUDICATION PRESENT: ICD-10-CM

## 2018-04-27 DIAGNOSIS — E03.9 ACQUIRED HYPOTHYROIDISM: ICD-10-CM

## 2018-04-27 PROCEDURE — 99214 OFFICE O/P EST MOD 30 MIN: CPT | Mod: S$GLB,,, | Performed by: INTERNAL MEDICINE

## 2018-04-27 PROCEDURE — 99999 PR PBB SHADOW E&M-EST. PATIENT-LVL IV: CPT | Mod: PBBFAC,,, | Performed by: INTERNAL MEDICINE

## 2018-04-27 PROCEDURE — 99499 UNLISTED E&M SERVICE: CPT | Mod: S$GLB,,, | Performed by: INTERNAL MEDICINE

## 2018-04-27 NOTE — PROGRESS NOTES
Subjective:       Patient ID: Edwar Jeffries is a 89 y.o. male.    Chief Complaint: Epistaxis    This is a new patient to me.  I have reviewed the PMH,  and  for this patient. He presents today with his daughter for treatment of recurrent nose bleeds. HE has been to the ER once for it and was treated with affrin nasal spray. It started bleeding again last night, but stopped with packing. He has not had labs since June. HE has mild renal insufficiency. HE has been to pain management about back pain and neck pain. HE wears a back brace and a neck collar. HE states that the collar helps because it keeps his neck warm. HE has been prescribed ultracet by Dr De Santiago for pain relief in the past. He does not take it tid most of the time. I have suggested taht he try taking es tylenol instead of ultracet due to the risks associated with narcotic use.       Epistaxis    The bleeding has been from the left nare. This is a recurrent problem. The current episode started in the past 7 days. He has experienced no nasal trauma. The problem occurs every several days. The problem has been unchanged. The bleeding is associated with nose-picking. He has tried pressure and vasoconstrictor for the symptoms. The treatment provided mild relief. His past medical history is significant for allergies.     Review of Systems   Constitutional: Negative for chills, fatigue and fever.   HENT: Positive for nosebleeds. Negative for congestion, ear discharge, ear pain, rhinorrhea and sore throat.    Eyes: Negative for discharge, redness and itching.   Respiratory: Negative for cough, chest tightness, shortness of breath and wheezing.    Cardiovascular: Negative for chest pain, palpitations and leg swelling.   Gastrointestinal: Negative for abdominal pain, constipation, diarrhea, nausea and vomiting.   Endocrine: Negative for cold intolerance and heat intolerance.   Genitourinary: Negative for dysuria, flank pain, frequency and hematuria.    Musculoskeletal: Negative for arthralgias, back pain, joint swelling and myalgias.   Skin: Negative for color change and rash.   Neurological: Negative for dizziness, tremors, numbness and headaches.   Psychiatric/Behavioral: Negative for dysphoric mood and sleep disturbance. The patient is not nervous/anxious.        Objective:      Physical Exam   Constitutional: He appears well-developed and well-nourished.   HENT:   Head: Normocephalic and atraumatic.   Right Ear: External ear normal. Tympanic membrane is not erythematous. No middle ear effusion.   Left Ear: External ear normal. Tympanic membrane is not erythematous.  No middle ear effusion.   Mouth/Throat: No posterior oropharyngeal edema or posterior oropharyngeal erythema. No tonsillar exudate.   Eyes: Conjunctivae and EOM are normal. Pupils are equal, round, and reactive to light.   Neck: No thyromegaly present.   Cardiovascular: Normal rate, regular rhythm, normal heart sounds and intact distal pulses.    No murmur heard.  Pulmonary/Chest: Effort normal and breath sounds normal. He has no wheezes.   Abdominal: He exhibits no distension. There is no tenderness.   Musculoskeletal: He exhibits no edema or tenderness.   Lymphadenopathy:     He has no cervical adenopathy.   Neurological: He displays normal reflexes. No cranial nerve deficit.   Skin: Skin is warm and dry. No rash noted.   Psychiatric: He has a normal mood and affect. His behavior is normal.       Assessment and Plan:     Problem List Items Addressed This Visit     Acquired hypothyroidism - w otto check TSH because his tsh was abnormal last time.     Relevant Orders    TSH    Spondylosis of cervical region without myelopathy or radiculopathy- Continue wearing brace. I refilled his ultracet tid. I would encourage him to try es tylenol instead.       Other Visit Diagnoses     Epistaxis    -  Primary - we will refer to Dr Fletcher.     Relevant Orders    CBC auto differential (Completed)     Comprehensive metabolic panel (Completed)    Ambulatory referral to ENT    Spinal stenosis of lumbar region, unspecified whether neurogenic claudication present    - try tylenol instead of ultracet.       Bilateral hearing loss, unspecified hearing loss type    - he doesn't want hearingaides.

## 2018-04-27 NOTE — PATIENT INSTRUCTIONS
Let's check some lab work to see if there is an underlying cause of the nose bleeds. We will also refer to dr Fletcher to look for structural abnormality. Continue to pack it if needed for now. We will also recheck thyroid test since it was abnormal last time. I suggested that he try extra strength tylenol instead of ultracet for pain since it is safer for geriatrics.

## 2018-04-29 DIAGNOSIS — E03.9 HYPOTHYROIDISM, UNSPECIFIED TYPE: Primary | ICD-10-CM

## 2018-04-29 RX ORDER — LEVOTHYROXINE SODIUM 100 UG/1
100 TABLET ORAL DAILY
Qty: 30 TABLET | Refills: 3 | Status: SHIPPED | OUTPATIENT
Start: 2018-04-29 | End: 2019-04-29

## 2018-04-30 ENCOUNTER — TELEPHONE (OUTPATIENT)
Dept: FAMILY MEDICINE | Facility: CLINIC | Age: 83
End: 2018-04-30

## 2018-04-30 NOTE — TELEPHONE ENCOUNTER
----- Message from Ml Dennis MD sent at 4/29/2018 10:19 PM CDT -----  TSH is too high. We need to increase his levothyroxine to 100 mcg a day and recheck tsh in a month. The blood chemistries, kidney function test  and liver function tests were within normal range.CBC was OK.

## 2018-06-05 RX ORDER — TRAMADOL HYDROCHLORIDE AND ACETAMINOPHEN 37.5; 325 MG/1; MG/1
1 TABLET, FILM COATED ORAL EVERY 6 HOURS PRN
Qty: 90 TABLET | Refills: 0 | OUTPATIENT
Start: 2018-06-05

## 2018-06-05 NOTE — TELEPHONE ENCOUNTER
"Notified daughter of message below, she said "O I did not know that" appt made for 6/22/18, first available & to call back any day if she felt like he needed to be seen earlier. She said ok.   "

## 2018-06-05 NOTE — TELEPHONE ENCOUNTER
As discussed at his appointment, I would prefer that he take tylenol extra strength as needed for pain. They can make another appointment to discuss and re-evaluate if the tylenol is not working.

## 2018-06-05 NOTE — TELEPHONE ENCOUNTER
----- Message from Zeina Curtis sent at 6/5/2018 11:01 AM CDT -----  Contact: Madisyn(daughter)/  [phone]  Patient's daughter called in to get prescription refill. Please call and advise.     tramadol-acetaminophen 37.5-325 mg (ULTRACET) 37.5-325 mg Tab    [facility] [place] [facility] [address], [place]

## 2018-06-15 RX ORDER — TRAMADOL HYDROCHLORIDE AND ACETAMINOPHEN 37.5; 325 MG/1; MG/1
1 TABLET, FILM COATED ORAL EVERY 12 HOURS PRN
Qty: 60 TABLET | Refills: 2 | Status: SHIPPED | OUTPATIENT
Start: 2018-06-15 | End: 2018-10-02 | Stop reason: SDUPTHER

## 2018-06-15 NOTE — TELEPHONE ENCOUNTER
Pt has tried tylenol OTC as directed and is not effective for his pain; pt requesting refill of tramadol, send to [facility] please

## 2018-09-17 RX ORDER — TRAZODONE HYDROCHLORIDE 50 MG/1
50 TABLET ORAL NIGHTLY PRN
Qty: 30 TABLET | Refills: 2 | Status: SHIPPED | OUTPATIENT
Start: 2018-09-17

## 2018-09-17 RX ORDER — FLUTICASONE PROPIONATE AND SALMETEROL 50; 250 UG/1; UG/1
1 POWDER RESPIRATORY (INHALATION) 2 TIMES DAILY
Qty: 60 EACH | Refills: 2 | Status: SHIPPED | OUTPATIENT
Start: 2018-09-17

## 2018-09-17 RX ORDER — TRAMADOL HYDROCHLORIDE AND ACETAMINOPHEN 37.5; 325 MG/1; MG/1
1 TABLET, FILM COATED ORAL EVERY 12 HOURS PRN
Qty: 60 TABLET | Refills: 2 | OUTPATIENT
Start: 2018-09-17

## 2018-09-17 RX ORDER — LOVASTATIN 20 MG/1
20 TABLET ORAL NIGHTLY
Qty: 90 TABLET | Refills: 2 | Status: SHIPPED | OUTPATIENT
Start: 2018-09-17

## 2018-09-17 NOTE — TELEPHONE ENCOUNTER
----- Message from Yanique Marie sent at 9/17/2018  2:47 PM CDT -----  Contact: [phone]/ pts daughter Madisyn   Called in requesting refill of the following medications to be sent to [facility] - [place] - [address], [place]     1. lovastatin (MEVACOR) 20 MG tablet  2. traZODone (DESYREL) 50 MG tablet

## 2018-09-17 NOTE — TELEPHONE ENCOUNTER
----- Message from Soo Valerio sent at 9/17/2018  2:03 PM CDT -----  Patient's daughter, Madisyn, called.   No. [phone]   Patient needs a refill on Tramadol/APAP 37.5-325mg, 2 daily, #60.  [facility]     She also needs a new script for Advair Diskus inhaler 250-50mcg.

## 2018-09-17 NOTE — TELEPHONE ENCOUNTER
I refilled all of his meds except the ultracet. It is a controlled drug and the patient has to be seen every 3 months to continue that medication.

## 2018-09-27 ENCOUNTER — PES CALL (OUTPATIENT)
Dept: ADMINISTRATIVE | Facility: CLINIC | Age: 83
End: 2018-09-27

## 2018-10-02 ENCOUNTER — OFFICE VISIT (OUTPATIENT)
Dept: FAMILY MEDICINE | Facility: CLINIC | Age: 83
End: 2018-10-02
Payer: MEDICARE

## 2018-10-02 VITALS
OXYGEN SATURATION: 97 % | WEIGHT: 154 LBS | SYSTOLIC BLOOD PRESSURE: 118 MMHG | DIASTOLIC BLOOD PRESSURE: 78 MMHG | BODY MASS INDEX: 21.56 KG/M2 | HEART RATE: 72 BPM | HEIGHT: 71 IN | RESPIRATION RATE: 16 BRPM

## 2018-10-02 DIAGNOSIS — M47.816 OSTEOARTHRITIS OF LUMBAR SPINE, UNSPECIFIED SPINAL OSTEOARTHRITIS COMPLICATION STATUS: ICD-10-CM

## 2018-10-02 DIAGNOSIS — F51.04 PSYCHOPHYSIOLOGICAL INSOMNIA: ICD-10-CM

## 2018-10-02 DIAGNOSIS — I70.0 ATHEROSCLEROSIS OF AORTA: ICD-10-CM

## 2018-10-02 DIAGNOSIS — M48.02 SPINAL STENOSIS, CERVICAL REGION: ICD-10-CM

## 2018-10-02 DIAGNOSIS — E03.9 ACQUIRED HYPOTHYROIDISM: Primary | ICD-10-CM

## 2018-10-02 DIAGNOSIS — J43.8 OTHER EMPHYSEMA: ICD-10-CM

## 2018-10-02 DIAGNOSIS — E78.5 HYPERLIPIDEMIA, UNSPECIFIED HYPERLIPIDEMIA TYPE: ICD-10-CM

## 2018-10-02 PROCEDURE — 99214 OFFICE O/P EST MOD 30 MIN: CPT | Mod: PBBFAC,PN | Performed by: INTERNAL MEDICINE

## 2018-10-02 PROCEDURE — 99999 PR PBB SHADOW E&M-EST. PATIENT-LVL IV: CPT | Mod: PBBFAC,,, | Performed by: INTERNAL MEDICINE

## 2018-10-02 PROCEDURE — 1100F PTFALLS ASSESS-DOCD GE2>/YR: CPT | Mod: CPTII,,, | Performed by: INTERNAL MEDICINE

## 2018-10-02 PROCEDURE — 99499 UNLISTED E&M SERVICE: CPT | Mod: S$GLB,,, | Performed by: INTERNAL MEDICINE

## 2018-10-02 PROCEDURE — 3288F FALL RISK ASSESSMENT DOCD: CPT | Mod: CPTII,,, | Performed by: INTERNAL MEDICINE

## 2018-10-02 PROCEDURE — 99214 OFFICE O/P EST MOD 30 MIN: CPT | Mod: S$PBB,,, | Performed by: INTERNAL MEDICINE

## 2018-10-02 RX ORDER — TRAMADOL HYDROCHLORIDE AND ACETAMINOPHEN 37.5; 325 MG/1; MG/1
1 TABLET, FILM COATED ORAL DAILY PRN
Qty: 30 TABLET | Refills: 2 | Status: SHIPPED | OUTPATIENT
Start: 2018-10-02

## 2018-10-02 NOTE — PATIENT INSTRUCTIONS
We have reviewed your prescription medications.  I refilled his ultracet for once a day with 2 refills. He will need to be seen every 3 months to get narcotic prescriptions. We did not have the high-dose flu shot today. Please try to get it at your pharmacy. I have ordered labs to recheck his thyroid level since we changed the dose last time.

## 2018-10-05 ENCOUNTER — TELEPHONE (OUTPATIENT)
Dept: FAMILY MEDICINE | Facility: CLINIC | Age: 83
End: 2018-10-05

## 2018-10-05 NOTE — LETTER
October 15, 2018    Edwar Jeffries  [address] [place] [address]             [address]   [place] [address]  Phone: [phone]  Fax: [phone] Dear Mr. Jeffries:    We have been trying to contact to contact you regarding your test results. Please contact the office at [phone].    Sincerely,        Payal Alejandro LPN

## 2018-10-05 NOTE — PROGRESS NOTES
Subjective:       Patient ID: Edwar Jeffries is a 89 y.o. male.    Chief Complaint: refills and Follow-up     I have reviewed the PMH,  and  for this patient. HE is here with 2 family members. HE has a history of spinal stenosis in his neck and DJD in his lower back. They would like a refill of his ultracet. They do not think that tylenol is effective enough. They say he needs his ultracet at night. His TSH was abnormal last time, and I adjusted his thyroid dose, but it has not been rechecked yet. HE has been taking trazodone at night for sleep and it works well.       Review of Systems   Constitutional: Negative for chills, fatigue and fever.   HENT: Negative for congestion, ear discharge, ear pain, rhinorrhea and sore throat.    Eyes: Negative for discharge, redness and itching.   Respiratory: Negative for cough, chest tightness, shortness of breath and wheezing.    Cardiovascular: Negative for chest pain, palpitations and leg swelling.   Gastrointestinal: Negative for abdominal pain, constipation, diarrhea, nausea and vomiting.   Endocrine: Negative for cold intolerance and heat intolerance.   Genitourinary: Negative for dysuria, flank pain, frequency and hematuria.   Musculoskeletal: Negative for arthralgias, back pain, joint swelling and myalgias.   Skin: Negative for color change and rash.   Neurological: Negative for dizziness, tremors, numbness and headaches.   Psychiatric/Behavioral: Negative for dysphoric mood and sleep disturbance. The patient is not nervous/anxious.        Objective:      Physical Exam   Constitutional: He appears well-developed and well-nourished.   HENT:   Head: Normocephalic and atraumatic.   Right Ear: External ear normal. Tympanic membrane is not erythematous. No middle ear effusion.   Left Ear: External ear normal. Tympanic membrane is not erythematous.  No middle ear effusion.   Mouth/Throat: No posterior oropharyngeal edema or posterior oropharyngeal erythema. No  tonsillar exudate.   Eyes: Conjunctivae and EOM are normal. Pupils are equal, round, and reactive to light.   Neck: No thyromegaly present.   Cardiovascular: Normal rate, regular rhythm, normal heart sounds and intact distal pulses.   No murmur heard.  Pulmonary/Chest: Effort normal and breath sounds normal. He has no wheezes.   Abdominal: He exhibits no distension. There is no tenderness.   Musculoskeletal: He exhibits no edema or tenderness.   Lymphadenopathy:     He has no cervical adenopathy.   Neurological: He displays normal reflexes. No cranial nerve deficit.   Skin: Skin is warm and dry. No rash noted.   Psychiatric: He has a normal mood and affect. His behavior is normal.       Assessment and Plan:     Problem List Items Addressed This Visit     Acquired hypothyroidism - Primary - recheck tsh since dose adjusted.     Relevant Orders    TSH (Completed)    Hyperlipidemia -  On lovastatin. Check CMP    Relevant Orders    Comprehensive metabolic panel (Completed)    Emphysema of lung - stable.       Atherosclerosis of aorta - stable. On lovastatin      Osteoarthritis of lumbar spine - we will refill ultracet for use at night.       Spinal stenosis, cervical region - as above.       Psychophysiological insomnia - take trazodone If needed.

## 2018-10-05 NOTE — TELEPHONE ENCOUNTER
----- Message from Ml Dennis MD sent at 10/5/2018  1:58 PM CDT -----  His TSh is worse than before. Has he been taking his thyroid medicine? I increased it to 100 mcg last time, and now it is even worse. If he has been taking it, we need to increase to 125 mcg a day.

## 2018-10-08 ENCOUNTER — TELEPHONE (OUTPATIENT)
Dept: FAMILY MEDICINE | Facility: CLINIC | Age: 83
End: 2018-10-08

## 2018-10-12 ENCOUNTER — TELEPHONE (OUTPATIENT)
Dept: FAMILY MEDICINE | Facility: CLINIC | Age: 83
End: 2018-10-12

## 2018-10-12 NOTE — TELEPHONE ENCOUNTER
----- Message from Rochelle Vaughn LPN sent at 10/5/2018  2:24 PM CDT -----  Pt return call re: thyroid test?

## 2018-10-15 NOTE — TELEPHONE ENCOUNTER
Rec'd message, L/M for patient to call the office. Sent letter for patient to contact the office regarding his test results.

## 2018-11-16 ENCOUNTER — OFFICE VISIT (OUTPATIENT)
Dept: FAMILY MEDICINE | Facility: CLINIC | Age: 83
End: 2018-11-16
Payer: MEDICARE

## 2018-11-16 VITALS
BODY MASS INDEX: 21.48 KG/M2 | SYSTOLIC BLOOD PRESSURE: 100 MMHG | DIASTOLIC BLOOD PRESSURE: 76 MMHG | OXYGEN SATURATION: 94 % | HEART RATE: 73 BPM | TEMPERATURE: 98 F | HEIGHT: 71 IN

## 2018-11-16 DIAGNOSIS — M48.02 SPINAL STENOSIS, CERVICAL REGION: ICD-10-CM

## 2018-11-16 DIAGNOSIS — R53.1 WEAKNESS: Primary | ICD-10-CM

## 2018-11-16 DIAGNOSIS — L89.121: ICD-10-CM

## 2018-11-16 DIAGNOSIS — M48.061 SPINAL STENOSIS OF LUMBAR REGION, UNSPECIFIED WHETHER NEUROGENIC CLAUDICATION PRESENT: ICD-10-CM

## 2018-11-16 LAB
BILIRUB SERPL-MCNC: ABNORMAL MG/DL
BLOOD, POC UA: ABNORMAL
GLUCOSE UR QL STRIP: NORMAL
KETONES UR QL STRIP: ABNORMAL
LEUKOCYTE ESTERASE URINE, POC: ABNORMAL
NITRITE, POC UA: ABNORMAL
PH, POC UA: 5
PROTEIN, POC: ABNORMAL
SPECIFIC GRAVITY, POC UA: 1.02
UROBILINOGEN, POC UA: NORMAL

## 2018-11-16 PROCEDURE — 1101F PT FALLS ASSESS-DOCD LE1/YR: CPT | Mod: CPTII,HCWC,S$GLB, | Performed by: INTERNAL MEDICINE

## 2018-11-16 PROCEDURE — 99999 PR PBB SHADOW E&M-EST. PATIENT-LVL III: CPT | Mod: PBBFAC,HCWC,, | Performed by: INTERNAL MEDICINE

## 2018-11-16 PROCEDURE — 99214 OFFICE O/P EST MOD 30 MIN: CPT | Mod: HCWC,25,S$GLB, | Performed by: INTERNAL MEDICINE

## 2018-11-16 PROCEDURE — 81000 URINALYSIS NONAUTO W/SCOPE: CPT | Mod: HCWC,S$GLB,, | Performed by: INTERNAL MEDICINE

## 2018-11-16 PROCEDURE — 96372 THER/PROPH/DIAG INJ SC/IM: CPT | Mod: HCWC,S$GLB,, | Performed by: INTERNAL MEDICINE

## 2018-11-16 RX ORDER — TRIAMCINOLONE ACETONIDE 40 MG/ML
40 INJECTION, SUSPENSION INTRA-ARTICULAR; INTRAMUSCULAR
Status: COMPLETED | OUTPATIENT
Start: 2018-11-16 | End: 2018-11-16

## 2018-11-16 RX ADMIN — TRIAMCINOLONE ACETONIDE 40 MG: 40 INJECTION, SUSPENSION INTRA-ARTICULAR; INTRAMUSCULAR at 05:11

## 2018-11-16 NOTE — PATIENT INSTRUCTIONS
We will give him a steroid shot to help his neck and back pain. I recommend that we cover his bedsore with a tegaderm patch and change it every 3 days. Try to keep the side he sleeps on padded to prevent further breakdown and encourage him to sleep on the other side when possible. The urine analysis was ok.

## 2018-11-19 NOTE — PROGRESS NOTES
Subjective:       Patient ID: Edwar Jeffries is a 89 y.o. male.    Chief Complaint: Fatigue and Extremity Weakness     I have reviewed the PM,  and  for this patient. He is here for evaluation of weakness and lethargy. He has his two daughters with him. They report that he has been staying in bed all the time. His daughter reports that his urine is dark. He reports that he is just hurting all over. He has a history of cervical disc disease and lumbar disc disease. HE also has a wound on his left shoulder.       Review of Systems   Constitutional: Negative for chills, fatigue and fever.   HENT: Negative for congestion, ear discharge, ear pain, rhinorrhea and sore throat.    Eyes: Negative for discharge, redness and itching.   Respiratory: Negative for cough, chest tightness, shortness of breath and wheezing.    Cardiovascular: Negative for chest pain, palpitations and leg swelling.   Gastrointestinal: Negative for abdominal pain, constipation, diarrhea, nausea and vomiting.   Endocrine: Negative for cold intolerance and heat intolerance.   Genitourinary: Negative for dysuria, flank pain, frequency and hematuria.   Musculoskeletal: Negative for arthralgias, back pain, joint swelling and myalgias.   Skin: Negative for color change and rash.   Neurological: Negative for dizziness, tremors, numbness and headaches.   Psychiatric/Behavioral: Negative for dysphoric mood and sleep disturbance. The patient is not nervous/anxious.        Objective:      Physical Exam   Constitutional: He appears well-developed and well-nourished.   HENT:   Head: Normocephalic and atraumatic.   Right Ear: External ear normal. Tympanic membrane is not erythematous. No middle ear effusion.   Left Ear: External ear normal. Tympanic membrane is not erythematous.  No middle ear effusion.   Mouth/Throat: No posterior oropharyngeal edema or posterior oropharyngeal erythema. No tonsillar exudate.   Eyes: Conjunctivae and EOM are normal. Pupils  are equal, round, and reactive to light.   Neck: No thyromegaly present.   Cardiovascular: Normal rate, regular rhythm, normal heart sounds and intact distal pulses.   No murmur heard.  Pulmonary/Chest: Effort normal and breath sounds normal. He has no wheezes.   Abdominal: He exhibits no distension. There is no tenderness.   Musculoskeletal: He exhibits no edema or tenderness.   Lymphadenopathy:     He has no cervical adenopathy.   Neurological: He displays normal reflexes. No cranial nerve deficit.   Skin: Skin is warm and dry. No rash noted.        Psychiatric: He has a normal mood and affect. His behavior is normal.       Assessment and Plan:     Problem List Items Addressed This Visit        Neuro    Spinal stenosis of lumbar region - chronic. Stable.     Spinal stenosis, cervical region - chronic . Stable.        Other    Pressure injury of left upper back, stage 1 - cover with tegaderm. Try to keep him off that shoulder and/or put extra padding under it.       Other Visit Diagnoses     Weakness    -  Primary - UA was OK. Could be due to pain. We will treat with a steroid shot to relieve some of his pain.     Relevant Orders    POCT Urinalysis

## 2019-01-08 DIAGNOSIS — S41.002A WOUND OF LEFT SHOULDER, INITIAL ENCOUNTER: Primary | ICD-10-CM

## 2019-01-10 ENCOUNTER — TELEPHONE (OUTPATIENT)
Dept: FAMILY MEDICINE | Facility: CLINIC | Age: 84
End: 2019-01-10

## 2019-01-10 NOTE — TELEPHONE ENCOUNTER
----- Message from Alex Marie sent at 1/10/2019 12:24 PM CST -----  Contact: Viv Castro with [facility]/[phone]  Viv called to get orders for the patient from your office.    Please call and advised.

## 2019-01-10 NOTE — TELEPHONE ENCOUNTER
Called and spoke to Viv in regards to adding padding to Mr. Jeffries wound care orders due to not taking much pressure off the side. Instructed that it was okay if it would provide relief to patient.

## 2019-01-10 NOTE — TELEPHONE ENCOUNTER
----- Message from Lety Dela Cruz sent at 1/10/2019 12:20 PM CST -----  Contact: Daughter [phone]  Patient would like to speak with you about not hearing anything from the home health nurse. Please advise

## 2019-01-14 ENCOUNTER — TELEPHONE (OUTPATIENT)
Dept: FAMILY MEDICINE | Facility: CLINIC | Age: 84
End: 2019-01-14

## 2019-01-14 NOTE — TELEPHONE ENCOUNTER
----- Message from Lexy Mccray sent at 1/14/2019 12:59 PM CST -----  Contact: Reta Calling from [facility] [phone]  Home Health nurse is calling to talk to nurse in regards to patient has not had a bowel movement from last Wednesday and they will start on [place] lax. Please advice

## 2019-01-14 NOTE — TELEPHONE ENCOUNTER
Spoke with nurse and was calling to inform that care giver reports that patient is normally irregular and they would give him some miralax and monitor his BMs and would call the office back by Wednesday.

## 2019-01-21 ENCOUNTER — TELEPHONE (OUTPATIENT)
Dept: ADMINISTRATIVE | Facility: CLINIC | Age: 84
End: 2019-01-21

## 2019-01-21 NOTE — TELEPHONE ENCOUNTER
Home Health SOC 01/10/2019 - 03/10/2019 with [facility] - Dr. Ml Dennis. Patient received SN and PT services.

## 2019-01-22 ENCOUNTER — TELEPHONE (OUTPATIENT)
Dept: FAMILY MEDICINE | Facility: CLINIC | Age: 84
End: 2019-01-22

## 2019-01-22 NOTE — TELEPHONE ENCOUNTER
----- Message from Yanique Marie sent at 1/22/2019 11:53 AM CST -----  Contact: [phone]/ Brandt with [facility]   Called in to report that pt experienced a fall last night while getting out of bed but doesn't have any injuries.

## 2019-01-30 ENCOUNTER — TELEPHONE (OUTPATIENT)
Dept: FAMILY MEDICINE | Facility: CLINIC | Age: 84
End: 2019-01-30

## 2019-01-30 NOTE — TELEPHONE ENCOUNTER
----- Message from Gerri Zhong sent at 1/30/2019 12:02 PM CST -----  Contact: Jolie sandoval/ [facility] [phone]  Nurse requesting to speak with you concerning frequent urination at night. Please advise.

## 2019-02-01 ENCOUNTER — TELEPHONE (OUTPATIENT)
Dept: FAMILY MEDICINE | Facility: CLINIC | Age: 84
End: 2019-02-01

## 2019-02-01 NOTE — TELEPHONE ENCOUNTER
----- Message from Zeina Curtis sent at 2/1/2019  2:33 PM CST -----  Contact: Kacie sandoval/ [facility] in [place]/ [phone]  Nurse called in to get hospice orders for patient. She stated they can get a verbal since family is ready.    Please call.

## 2019-02-01 NOTE — TELEPHONE ENCOUNTER
Spoke to chely and reported that the family was ready for patient to go on hospice. Gave verbal and asked to call with any questions or concerns

## 2019-02-03 PROBLEM — R53.1 WEAKNESS: Status: ACTIVE | Noted: 2019-02-03

## 2019-02-03 PROBLEM — Z51.5 HOSPICE CARE PATIENT: Status: ACTIVE | Noted: 2019-02-03

## 2019-02-03 PROBLEM — M89.9 LYTIC BONE LESIONS ON XRAY: Status: ACTIVE | Noted: 2019-02-03
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
## 2019-02-03 PROBLEM — D64.9 ANEMIA: Status: ACTIVE | Noted: 2019-02-03

## 2019-02-04 PROBLEM — N30.00 ACUTE CYSTITIS WITHOUT HEMATURIA: Status: ACTIVE | Noted: 2019-02-04

## 2019-10-01 ENCOUNTER — PES CALL (OUTPATIENT)
Dept: ADMINISTRATIVE | Facility: CLINIC | Age: 84
End: 2019-10-01